# Patient Record
Sex: FEMALE | Race: WHITE | NOT HISPANIC OR LATINO | Employment: UNEMPLOYED | URBAN - METROPOLITAN AREA
[De-identification: names, ages, dates, MRNs, and addresses within clinical notes are randomized per-mention and may not be internally consistent; named-entity substitution may affect disease eponyms.]

---

## 2017-01-11 ENCOUNTER — TELEPHONE (OUTPATIENT)
Dept: OBSTETRICS AND GYNECOLOGY | Facility: CLINIC | Age: 27
End: 2017-01-11

## 2017-01-11 RX ORDER — LABETALOL 200 MG/1
200 TABLET, FILM COATED ORAL 2 TIMES DAILY
Qty: 60 TABLET | Refills: 1 | Status: SHIPPED | OUTPATIENT
Start: 2017-01-11 | End: 2017-01-13 | Stop reason: SDUPTHER

## 2017-01-11 RX ORDER — NIFEDIPINE 30 MG/1
30 TABLET, EXTENDED RELEASE ORAL DAILY
Qty: 30 TABLET | Refills: 1 | Status: SHIPPED | OUTPATIENT
Start: 2017-01-11 | End: 2017-01-13 | Stop reason: SDUPTHER

## 2017-01-11 NOTE — TELEPHONE ENCOUNTER
----- Message from Chioma Hughes sent at 1/11/2017 11:36 AM EST -----  Regarding: REFILL  Contact: [phone]  NEEDS A REFILL ON HER BLOOD PRESSURE MEDICINE. DR MACIAS. PHARMACY [facility] [address]. [phone]      Dr. Macias  pt  1/11/2017 @12:29pm [phone] patient requesting a refill on her blood pressure medicine: Procardia XL 30 mg and Labetalol 200mg. She has an appointment with Dr. Macias on 1/25/2017

## 2017-01-13 RX ORDER — NIFEDIPINE 30 MG/1
30 TABLET, EXTENDED RELEASE ORAL DAILY
Qty: 30 TABLET | Refills: 0 | Status: SHIPPED | OUTPATIENT
Start: 2017-01-13 | End: 2017-02-10 | Stop reason: SDUPTHER

## 2017-01-13 RX ORDER — LABETALOL 200 MG/1
200 TABLET, FILM COATED ORAL 2 TIMES DAILY
Qty: 60 TABLET | Refills: 0 | Status: SHIPPED | OUTPATIENT
Start: 2017-01-13 | End: 2018-02-02 | Stop reason: SDUPTHER

## 2017-01-16 RX ORDER — VITAMIN A ACETATE, .BETA.-CAROTENE, ASCORBIC ACID, CHOLECALCIFEROL, .ALPHA.-TOCOPHEROL ACETATE, DL-, THIAMINE MONONITRATE, RIBOFLAVIN, NIACINAMIDE, PYRIDOXINE HYDROCHLORIDE, FOLIC ACID, CYANOCOBALAMIN, CALCIUM CARBONATE, FERROUS FUMARATE, ZINC OXIDE, AND CUPRIC OXIDE 2000; 2000; 120; 400; 22; 1.84; 3; 20; 10; 1; 12; 200; 27; 25; 2 [IU]/1; [IU]/1; MG/1; [IU]/1; MG/1; MG/1; MG/1; MG/1; MG/1; MG/1; UG/1; MG/1; MG/1; MG/1; MG/1
TABLET ORAL
Qty: 30 TABLET | Refills: 0 | Status: SHIPPED | OUTPATIENT
Start: 2017-01-16 | End: 2019-03-28

## 2017-02-10 ENCOUNTER — TELEPHONE (OUTPATIENT)
Dept: OBSTETRICS AND GYNECOLOGY | Facility: CLINIC | Age: 27
End: 2017-02-10

## 2017-02-10 RX ORDER — NIFEDIPINE 30 MG/1
30 TABLET, EXTENDED RELEASE ORAL DAILY
Qty: 30 TABLET | Refills: 0 | Status: SHIPPED | OUTPATIENT
Start: 2017-02-10 | End: 2018-02-02 | Stop reason: SDUPTHER

## 2017-02-10 NOTE — TELEPHONE ENCOUNTER
----- Message from Polly De La Cruz sent at 2/10/2017 10:32 AM EST -----  Regarding: bp meds  Contact: [phone]  Dr Macias pt asking for refill on bp meds - Nifedipine 30   [facility] [address]        Pt is requesting a refill on her Nifedipine. She was given a mth refill last mth and no show her appt.

## 2018-02-02 ENCOUNTER — OFFICE VISIT (OUTPATIENT)
Dept: OBSTETRICS AND GYNECOLOGY | Facility: CLINIC | Age: 28
End: 2018-02-02

## 2018-02-02 VITALS
WEIGHT: 270 LBS | HEIGHT: 63 IN | SYSTOLIC BLOOD PRESSURE: 128 MMHG | DIASTOLIC BLOOD PRESSURE: 70 MMHG | BODY MASS INDEX: 47.84 KG/M2

## 2018-02-02 DIAGNOSIS — Z30.46 ENCOUNTER FOR SURVEILLANCE OF IMPLANTABLE SUBDERMAL CONTRACEPTIVE: Primary | ICD-10-CM

## 2018-02-02 PROCEDURE — 99213 OFFICE O/P EST LOW 20 MIN: CPT | Performed by: OBSTETRICS & GYNECOLOGY

## 2018-02-02 PROCEDURE — 11982 REMOVE DRUG IMPLANT DEVICE: CPT | Performed by: OBSTETRICS & GYNECOLOGY

## 2018-02-02 RX ORDER — LABETALOL 200 MG/1
200 TABLET, FILM COATED ORAL EVERY 12 HOURS SCHEDULED
Qty: 60 TABLET | Refills: 2 | Status: SHIPPED | OUTPATIENT
Start: 2018-02-02 | End: 2019-03-28

## 2018-02-02 RX ORDER — SODIUM CHLORIDE 0.9 % (FLUSH) 0.9 %
1-10 SYRINGE (ML) INJECTION AS NEEDED
Status: CANCELLED | OUTPATIENT
Start: 2018-02-02

## 2018-02-02 RX ORDER — NIFEDIPINE 30 MG/1
30 TABLET, EXTENDED RELEASE ORAL DAILY
Qty: 30 TABLET | Refills: 2 | Status: SHIPPED | OUTPATIENT
Start: 2018-02-02 | End: 2019-03-28 | Stop reason: SDUPTHER

## 2018-02-02 RX ORDER — ACETAMINOPHEN AND CODEINE PHOSPHATE 120; 12 MG/5ML; MG/5ML
1 SOLUTION ORAL DAILY
Qty: 28 TABLET | Refills: 3 | Status: SHIPPED | OUTPATIENT
Start: 2018-02-02 | End: 2019-02-02

## 2018-02-02 NOTE — PROGRESS NOTES
"Nexplanon Removal    Date of procedure:  2/2/2018    Risks and benefits discussed? yes  All questions answered? yes  Consents given by the patient  Written consent obtained? yes    Local anesthesia used:  2-3 ml 1% lidocaine near previous insertion site    Procedure documentation:    The upper left arm  was marked at the intended site of removal.  Betadine was used to cleanse the skin.  1% lidocaine was injected.  An incision was created near the distal tip of the implant.  The implant was removed intact without difficulty.  Steri-strips were placed across the site of insertion and a band-aid  was placed.    She tolerated the procedure well.  There were no complications.  EBL was minimal.    Post procedure instructions: Remove the band-aid in 24 hours and the steri-strips in 5 days. Keep the arm \"high and dry\" for 24-36 hours. Use ice or NSAID's as needed.    Follow up needed: PRN    This note was electronically signed.    Henri Macias M.D.  February 2, 2018        "

## 2018-02-02 NOTE — PROGRESS NOTES
"Subjective   Chief Complaint   Patient presents with   • Contraception     Wants to discuss tubal instead of nexplanon, wants her nexplanon removed     Kelin Jimenez is a 27 y.o. year old .  No LMP recorded. Patient has had an implant.  She presents to be seen because of She has had problems with weight gain on the Nexplanon.  Also some moodiness potentially.  She wants to get her tubes tied.  She's had 1 child in the past.  She is here with her son and father of baby who I believe has multiple other children with other partners.  Also here with her sister.  She was here tubes tied.  Mention that at 54 and [age] and her 70 pounds to be increased risk for laparoscopic surgery and that I would prefer to do an Essure procedure.  He is calm per spokesman due to developmental delay and limited intellectual capacity.  In any event he is agreeable with me as long as it is permanent surgical sterilization.  She understands that this.  Discussed that she'll need to sign a somewhat to wait 30 days given her insurance dictates that.    Past 6 month menstrual and contraceptive history:    No LMP recorded. Patient has had an implant.                              The following portions of the patient's history were reviewed and updated as appropriate:current medications and allergies no family history of any gynecologic cancers or breast cancer    Review of Systems regular bladder and bowels     Objective   /70  Ht 158.8 cm (62.5\")  Wt 122 kg (270 lb)  BMI 48.6 kg/m2    General:  well developed; well nourished  no acute distress  appears stated age  obese - Body mass index is 48.6 kg/(m^2).   Skin:  No suspicious lesions seen   Thyroid: not examined   Lungs:  breathing is unlabored   Heart:  Not performed.   Abdomen: soft, non-tender; no masses  no umbilical or inginual hernias are present  no hepato-splenomegaly   Pelvis: Not performed.     Lab Review   No data reviewed    Imaging   No data reviewed       Assessment "   1. Desires permanent surgical sterilization  2. Morbid obesity  3. Desires Nexplanon removal due to side effects which include weight gain.  Options discussed including birth control pills and also will stimulate weight King she reports she hasn't done that on pills in the past I discussed IUD if she is not 100% sure as opposed to the Essure which I would perform before a standard tubal ligation given her body mass index     Plan   1. She will sign paperwork for Essure sterilization and I will put in prep for surgery  2. I will refill her blood pressure medicines as she requests  3. Start Nor-QD birth control pills which should not affect her blood pressure adversely    Medications Rx this encounter:  New Medications Ordered This Visit   Medications   • NIFEdipine XL (NIFEDICAL XL) 30 MG 24 hr tablet     Sig: Take 1 tablet by mouth Daily.     Dispense:  30 tablet     Refill:  2   • labetalol (NORMODYNE) 200 MG tablet     Sig: Take 1 tablet by mouth Every 12 (Twelve) Hours.     Dispense:  60 tablet     Refill:  2   • norethindrone (MICRONOR) 0.35 MG tablet     Sig: Take 1 tablet by mouth Daily.     Dispense:  28 tablet     Refill:  3          This note was electronically signed.    Henri Macias MD  February 2, 2018

## 2018-02-27 ENCOUNTER — TELEPHONE (OUTPATIENT)
Dept: OBSTETRICS AND GYNECOLOGY | Facility: CLINIC | Age: 28
End: 2018-02-27

## 2018-02-27 NOTE — TELEPHONE ENCOUNTER
Dr Macias Pt is scheduled for an Essure procedure on 03/16/18 and she has questions regarding side effects.  She read on the internet about blood clots and other problems that can come from having this.  She states she did not speak with Dr. Macias regarding any of this before being scheduled.  She was informed that Dr. Macias and Estrella Payton were out this week so she may not get a call back until the early part of next week once they both return to the office.

## 2018-03-13 ENCOUNTER — TELEPHONE (OUTPATIENT)
Dept: OBSTETRICS AND GYNECOLOGY | Facility: CLINIC | Age: 28
End: 2018-03-13

## 2018-03-14 RX ORDER — PYRAZINAMIDE 500 MG/1
1 TABLET ORAL EVERY 4 HOURS PRN
Qty: 6 TABLET | Refills: 0 | Status: CANCELLED | OUTPATIENT
Start: 2018-03-14 | End: 2018-03-17

## 2018-03-14 RX ORDER — KETOROLAC TROMETHAMINE 10 MG/1
10 TABLET, FILM COATED ORAL EVERY 6 HOURS PRN
Qty: 6 TABLET | Refills: 0 | Status: SHIPPED | OUTPATIENT
Start: 2018-03-14 | End: 2019-03-28

## 2018-03-14 RX ORDER — PYRAZINAMIDE 500 MG/1
1 TABLET ORAL EVERY 4 HOURS PRN
Qty: 6 TABLET | Refills: 0 | Status: SHIPPED | OUTPATIENT
Start: 2018-03-14 | End: 2019-03-14

## 2018-03-14 RX ORDER — KETOROLAC TROMETHAMINE 10 MG/1
10 TABLET, FILM COATED ORAL EVERY 6 HOURS PRN
Qty: 6 TABLET | Refills: 0 | Status: SHIPPED | OUTPATIENT
Start: 2018-03-14 | End: 2018-03-17

## 2018-03-14 RX ORDER — ALPRAZOLAM 0.5 MG/1
0.5 TABLET ORAL 2 TIMES DAILY PRN
Qty: 2 TABLET | Refills: 0 | Status: SHIPPED | OUTPATIENT
Start: 2018-03-14 | End: 2019-03-28

## 2018-03-14 RX ORDER — ALPRAZOLAM 0.5 MG/1
0.5 TABLET ORAL 2 TIMES DAILY PRN
Qty: 1 TABLET | Refills: 0 | Status: CANCELLED | OUTPATIENT
Start: 2018-03-14 | End: 2018-03-15

## 2018-03-14 NOTE — TELEPHONE ENCOUNTER
In office procedure meds called to pharmacy. Can you sign these prescribtions, epic won't let me.

## 2018-03-15 ENCOUNTER — TELEPHONE (OUTPATIENT)
Dept: OBSTETRICS AND GYNECOLOGY | Facility: CLINIC | Age: 28
End: 2018-03-15

## 2018-03-23 ENCOUNTER — TELEPHONE (OUTPATIENT)
Dept: OBSTETRICS AND GYNECOLOGY | Facility: CLINIC | Age: 28
End: 2018-03-23

## 2018-03-26 ENCOUNTER — TELEPHONE (OUTPATIENT)
Dept: OBSTETRICS AND GYNECOLOGY | Facility: CLINIC | Age: 28
End: 2018-03-26

## 2018-03-26 NOTE — TELEPHONE ENCOUNTER
Explained to pt and her boyfriend that [employer] will not cover a hysterectomy for birth control.. Pt voices understanding and rescheduled the Essure for the 3rd time.   Garcia Westbrook informed of rhe date change.

## 2018-03-28 ENCOUNTER — TELEPHONE (OUTPATIENT)
Dept: OBSTETRICS AND GYNECOLOGY | Facility: CLINIC | Age: 28
End: 2018-03-28

## 2018-03-28 NOTE — TELEPHONE ENCOUNTER
Provider Name VERNELL  Reason for Call WANTS HYSTERECTOMY NOT ESSURE  Pharmacy Name    Call Back Number [phone]    I called the patient to inform that [facility] denied a [facility]rectomy for sterilization and she is insistant that she is high risk and if she gets pregnant again she could die on the table.   I need more documentation to support the need for a hysterectomy for [employer] to approve it.

## 2018-03-28 NOTE — TELEPHONE ENCOUNTER
Please remind her that if she cancels or misses this next appointment in April I would be hard pressed to reschedule her for surgery

## 2018-03-29 ENCOUNTER — TELEPHONE (OUTPATIENT)
Dept: OBSTETRICS AND GYNECOLOGY | Facility: CLINIC | Age: 28
End: 2018-03-29

## 2018-03-29 NOTE — TELEPHONE ENCOUNTER
Called patient, she doesn't want the ESSURE procedure, she wants a hysterectomy, she states that she called her insurance again yesterday and they told her it was covered.

## 2018-03-29 NOTE — TELEPHONE ENCOUNTER
Called pt to inform that [facility] is dening her hysterectomy for sterilization,  She refused to understand, cancelled Essure procedure and stated she will find a doctor that will do the surgery.  Copies of denials mailed to patient.

## 2018-03-30 ENCOUNTER — TELEPHONE (OUTPATIENT)
Dept: OBSTETRICS AND GYNECOLOGY | Facility: CLINIC | Age: 28
End: 2018-03-30

## 2018-03-30 NOTE — TELEPHONE ENCOUNTER
Kelin Jimenez called today to ask why her appt for hysterectomy was cancelled.    I told her that her insurance [employer] is not going to cover it for the diagnosis of sterilization.  She states that her medical records should state that she is unable to get pregnanct again due to preeclampsia and going into a coma, dying on table. I explained to her that this was explained to Dr. Macias and he also states if not covered by insurance for stereilizaitioin it cant be done.  I told her she is welcome to pay out of pocket  But until I get approval from her insurance I can not schedule and she hung up on me.

## 2018-03-30 NOTE — TELEPHONE ENCOUNTER
Unfortunately Kelin is very confused as far as preeclampsia, coma Fitz on dying on the table is an exaggeration.  I would not spend any time arguing with her as this has been explained in the past.

## 2018-05-30 ENCOUNTER — TELEPHONE (OUTPATIENT)
Dept: OBSTETRICS AND GYNECOLOGY | Facility: CLINIC | Age: 28
End: 2018-05-30

## 2018-05-30 RX ORDER — LABETALOL 200 MG/1
200 TABLET, FILM COATED ORAL EVERY 12 HOURS SCHEDULED
Qty: 60 TABLET | Refills: 0 | Status: SHIPPED | OUTPATIENT
Start: 2018-05-30 | End: 2018-08-28 | Stop reason: SDUPTHER

## 2018-05-30 RX ORDER — NIFEDIPINE 30 MG/1
30 TABLET, EXTENDED RELEASE ORAL DAILY
Qty: 30 TABLET | Refills: 0 | Status: SHIPPED | OUTPATIENT
Start: 2018-05-30 | End: 2018-09-06 | Stop reason: SDUPTHER

## 2018-05-30 RX ORDER — ACETAMINOPHEN AND CODEINE PHOSPHATE 120; 12 MG/5ML; MG/5ML
1 SOLUTION ORAL DAILY
Qty: 28 TABLET | Refills: 12 | Status: SHIPPED | OUTPATIENT
Start: 2018-05-30 | End: 2019-03-28 | Stop reason: SDUPTHER

## 2018-05-30 NOTE — TELEPHONE ENCOUNTER
VERNELL    PT NEEDING REFILLS ON BIRTH CONTROL, NIFEDIPINE AND BLOOD PRESSURE MEDS NOREPHINDRONE    PHARM [facility] [address]

## 2018-08-29 RX ORDER — LABETALOL 200 MG/1
TABLET, FILM COATED ORAL
Qty: 60 TABLET | Refills: 5 | Status: SHIPPED | OUTPATIENT
Start: 2018-08-29 | End: 2019-03-28

## 2018-09-06 RX ORDER — NIFEDIPINE 30 MG/1
TABLET, FILM COATED, EXTENDED RELEASE ORAL
Qty: 30 TABLET | Refills: 0 | Status: SHIPPED | OUTPATIENT
Start: 2018-09-06 | End: 2018-10-29 | Stop reason: SDUPTHER

## 2018-09-06 NOTE — TELEPHONE ENCOUNTER
Pt called inquiring about refills on blood pressure medicine. I informed her that were sent electronically yesterday and that she needs an appt.  She is scheduled for February 2019.  She indicates that she recently fell and broke her ankle which required pins and plates.

## 2018-09-06 NOTE — TELEPHONE ENCOUNTER
Gilberto      Pt awaiting a refill on blood pressure meds, nisedipine 30mg.     [facility] on [address], [address]

## 2018-10-29 RX ORDER — NIFEDIPINE 30 MG/1
TABLET, FILM COATED, EXTENDED RELEASE ORAL
Qty: 30 TABLET | Refills: 3 | Status: SHIPPED | OUTPATIENT
Start: 2018-10-29 | End: 2019-03-28

## 2019-03-28 ENCOUNTER — OFFICE VISIT (OUTPATIENT)
Dept: OBSTETRICS AND GYNECOLOGY | Facility: CLINIC | Age: 29
End: 2019-03-28

## 2019-03-28 VITALS
SYSTOLIC BLOOD PRESSURE: 118 MMHG | BODY MASS INDEX: 50.02 KG/M2 | HEIGHT: 64 IN | WEIGHT: 293 LBS | DIASTOLIC BLOOD PRESSURE: 80 MMHG

## 2019-03-28 DIAGNOSIS — Z01.419 ENCOUNTER FOR WELL WOMAN EXAM WITH ROUTINE GYNECOLOGICAL EXAM: Primary | ICD-10-CM

## 2019-03-28 DIAGNOSIS — Z30.2 ENCOUNTER FOR STERILIZATION: ICD-10-CM

## 2019-03-28 PROCEDURE — 99395 PREV VISIT EST AGE 18-39: CPT | Performed by: OBSTETRICS & GYNECOLOGY

## 2019-03-28 RX ORDER — ACETAMINOPHEN AND CODEINE PHOSPHATE 120; 12 MG/5ML; MG/5ML
1 SOLUTION ORAL DAILY
Qty: 28 TABLET | Refills: 12 | Status: SHIPPED | OUTPATIENT
Start: 2019-03-28 | End: 2019-06-12 | Stop reason: HOSPADM

## 2019-03-28 RX ORDER — NIFEDIPINE 30 MG/1
30 TABLET, EXTENDED RELEASE ORAL DAILY
Qty: 30 TABLET | Refills: 5 | Status: SHIPPED | OUTPATIENT
Start: 2019-03-28

## 2019-03-28 NOTE — PROGRESS NOTES
Subjective   Chief Complaint   Patient presents with   • Annual Exam     desires btl     Kelin Jimenez is a 28 y.o. year old  presenting to be seen for her annual exam.    Current birth control method: oral progesterone-only contraceptive.  She has hypertension well controlled on both labetalol and nifedipine.  I think I would like to see how she does on the nifedipine only.  She also wants her tubes tied since she is high risk pregnancy hypertension may be preeclampsia etc. she had discussed the Essure procedure last year but did not get this completed.  That is no longer an option.  Would been better given her body habitus.  Discussed the permanent nature of the procedure also discussed possibly doing a salpingectomy rather than just a Band-Aid or banding.    No LMP recorded. Patient is not currently having periods (Reason: Oral contraceptives).    She is sexually active.   Condoms are not typically used.    Plato is painful or she is having problems :no  She has concerns about domestic violence: no.    Cycle Frequency: regular, predictable and consistent every 28 - 32 days   Menstrual cycle character: flow is typically normal and flow is typically moderately heavy   Cycle Duration: 5 - 5   Number of heavy days of flows: 1   Intermenstrual bleeding present: no   Post-coital bleeding present: no     She exercises regularly: no.  Calcium intake: is not adequate.2  Caffeine intake: 4 + cups of coffee or equivalent   Self breast awareness:not asked.    Alcohol :does not drink  Social History    Tobacco Use      Smoking status: Never Smoker      Smokeless tobacco: Never Used      The following portions of the patient's history were reviewed and updated as is  appropriate:problem list, current medications, allergies, past family history, past medical history, past social history and past surgical history.    Current Outpatient Medications:   •  labetalol (NORMODYNE) 200 MG tablet, Take 1 tablet by mouth Every  "12 (Twelve) Hours., Disp: 60 tablet, Rfl: 2  •  NIFEdipine XL (NIFEDICAL XL) 30 MG 24 hr tablet, Take 1 tablet by mouth Daily., Disp: 30 tablet, Rfl: 2  •  norethindrone (MICRONOR) 0.35 MG tablet, Take 1 tablet by mouth Daily., Disp: 28 tablet, Rfl: 12  •  Prenatal Vit w/Us-Jvtytgppm-GQ (PNV PO), Take  by mouth., Disp: , Rfl:     Review of Systems    normal bladder and bowels  Objective   /80   Ht 161.3 cm (63.5\")   Wt 133 kg (294 lb)   Breastfeeding? No   BMI 51.26 kg/m²       General:  well developed; well nourished  no acute distress  appears stated age   Skin:  No suspicious lesions seen  acne   Thyroid:    Breasts:  Examined in supine position  Symmetric without masses or skin dimpling  Nipples normal without inversion, lesions or discharge  There are no palpable axillary nodes   Abdomen: soft, non-tender; no masses  no umbilical or inguinal hernias are present  no hepato-splenomegaly   Sl tender on the left   Pelvis: Clinical staff was present for exam  External genitalia:  normal appearance of the external genitalia including Bartholin's and [place]'s glands.  :  urethral meatus normal;  Vaginal:  normal pink mucosa without prolapse or lesions.  Cervix:  Pap obtained Difficult to visualize cervix due to body habitus  Uterus:  anteverted; Nontender once again difficult to palpate due to body habitus  Adnexa:  non palpable bilaterally.       Lab Review   No data reviewed    Imaging  No data reviewed       Assessment   1. Normal GYN examination as best I can tell.  2. Morbid obesity BMI 51.3  3. Desires sterilization.  We will also give information regarding IUD although this would be difficult to place in the office as I cannot really do an adequate pelvic examination due to body habitus.she declines a IUD                Plan   1. 1000 mg calcium in divided doses ideally in diet; regular exercise  2. Self breast awareness and mammogram protocols discussed.  3. Signed tubal consent form and given " information regarding above  4. Annual examination or sooner as needed; follow-up Pap testing        No orders of the defined types were placed in this encounter.    No orders of the defined types were placed in this encounter.           This note was electronically signed.    Henri Macias MD  3/28/2019

## 2019-04-01 ENCOUNTER — TELEPHONE (OUTPATIENT)
Dept: OBSTETRICS AND GYNECOLOGY | Facility: CLINIC | Age: 29
End: 2019-04-01

## 2019-04-01 ENCOUNTER — PREP FOR SURGERY (OUTPATIENT)
Dept: OTHER | Facility: HOSPITAL | Age: 29
End: 2019-04-01

## 2019-04-01 DIAGNOSIS — Z30.2 REQUEST FOR STERILIZATION: Primary | ICD-10-CM

## 2019-04-01 RX ORDER — CEFAZOLIN SODIUM 2 G/100ML
2 INJECTION, SOLUTION INTRAVENOUS
Status: CANCELLED | OUTPATIENT
Start: 2019-04-01

## 2019-04-01 RX ORDER — SODIUM CHLORIDE 0.9 % (FLUSH) 0.9 %
1-10 SYRINGE (ML) INJECTION AS NEEDED
Status: CANCELLED | OUTPATIENT
Start: 2019-04-01

## 2019-04-01 RX ORDER — SODIUM CHLORIDE 0.9 % (FLUSH) 0.9 %
3 SYRINGE (ML) INJECTION EVERY 12 HOURS SCHEDULED
Status: CANCELLED | OUTPATIENT
Start: 2019-04-01

## 2019-04-01 NOTE — TELEPHONE ENCOUNTER
Called Kelin to explain that Laparoscopic Tubal Banding will need to be done in Main OR. Date available is 5/16, she and family are agreeable to this date. Explained that she will need to come for a pre op appt and got to Pre Admission Testing prior so she will come for Pre op on 5/14 at 11:10 AM. Will also mail her a letter.with above information.

## 2019-04-02 PROBLEM — Z30.2 REQUEST FOR STERILIZATION: Status: ACTIVE | Noted: 2019-04-02

## 2019-05-13 ENCOUNTER — TELEPHONE (OUTPATIENT)
Dept: OBSTETRICS AND GYNECOLOGY | Facility: CLINIC | Age: 29
End: 2019-05-13

## 2019-05-13 NOTE — TELEPHONE ENCOUNTER
"Kelin calling to say she cannot come for her tubal this week. Says \"her old man has to be in court and may be in senior living.\" She has no other way to get here. I called OR scheduling, spoke w/ Fabby, then spoke w/ Pre op nurses to see if we could schedule surgery and PAT the same day. I was told this is OK, she can do PAT the morning of surgery since the required 30-day prior [place] Sterilization permit is signed. She can sign the other Op permit the morning of surgery.  Called pt back to explain this to her and we have moved her surgery to June 12th, 2019. She asked me to explain this to her sister, Valeri Mary who was there with her, which I did. I will also mail Kelin a letter with these new instructions and dates. Surgery re-scheduled to 6/12/19.  "

## 2019-05-14 ENCOUNTER — APPOINTMENT (OUTPATIENT)
Dept: PREADMISSION TESTING | Facility: HOSPITAL | Age: 29
End: 2019-05-14

## 2019-06-11 ENCOUNTER — TELEPHONE (OUTPATIENT)
Dept: OBSTETRICS AND GYNECOLOGY | Facility: CLINIC | Age: 29
End: 2019-06-11

## 2019-06-11 NOTE — TELEPHONE ENCOUNTER
Called Kelin to discuss plan for surgery scheduled tomorrow with Dr Macias. Confirmed that pt understands she needs to arrive at Surgery Registration at 7:00 AM since she will have PAT at that time. She also verbalizes understanding that she must have a responsible adult with her.and that she should not eat or drink anything after midnight tonight.

## 2019-06-12 ENCOUNTER — ANESTHESIA EVENT (OUTPATIENT)
Dept: PERIOP | Facility: HOSPITAL | Age: 29
End: 2019-06-12

## 2019-06-12 ENCOUNTER — ANESTHESIA (OUTPATIENT)
Dept: PERIOP | Facility: HOSPITAL | Age: 29
End: 2019-06-12

## 2019-06-12 ENCOUNTER — HOSPITAL ENCOUNTER (OUTPATIENT)
Facility: HOSPITAL | Age: 29
Discharge: HOME | End: 2019-06-12
Attending: OBSTETRICS & GYNECOLOGY | Admitting: OBSTETRICS & GYNECOLOGY

## 2019-06-12 VITALS
OXYGEN SATURATION: 95 % | DIASTOLIC BLOOD PRESSURE: 82 MMHG | HEART RATE: 98 BPM | SYSTOLIC BLOOD PRESSURE: 146 MMHG | BODY MASS INDEX: 51.91 KG/M2 | HEIGHT: 63 IN | WEIGHT: 293 LBS | TEMPERATURE: 97.5 F | RESPIRATION RATE: 18 BRPM

## 2019-06-12 DIAGNOSIS — Z30.2 REQUEST FOR STERILIZATION: ICD-10-CM

## 2019-06-12 LAB
B-HCG UR QL: NEGATIVE
INTERNAL NEGATIVE CONTROL: NEGATIVE
INTERNAL POSITIVE CONTROL: POSITIVE
Lab: NORMAL
POTASSIUM BLD-SCNC: 4 MMOL/L (ref 3.5–5.2)

## 2019-06-12 PROCEDURE — 25010000002 PROPOFOL 10 MG/ML EMULSION: Performed by: NURSE ANESTHETIST, CERTIFIED REGISTERED

## 2019-06-12 PROCEDURE — 81025 URINE PREGNANCY TEST: CPT | Performed by: ANESTHESIOLOGY

## 2019-06-12 PROCEDURE — 84132 ASSAY OF SERUM POTASSIUM: CPT | Performed by: ANESTHESIOLOGY

## 2019-06-12 PROCEDURE — 25010000002 KETOROLAC TROMETHAMINE PER 15 MG: Performed by: NURSE ANESTHETIST, CERTIFIED REGISTERED

## 2019-06-12 PROCEDURE — 58661 LAPAROSCOPY REMOVE ADNEXA: CPT | Performed by: OBSTETRICS & GYNECOLOGY

## 2019-06-12 PROCEDURE — 25010000002 FENTANYL CITRATE (PF) 100 MCG/2ML SOLUTION: Performed by: NURSE ANESTHETIST, CERTIFIED REGISTERED

## 2019-06-12 PROCEDURE — 25010000002 ONDANSETRON PER 1 MG: Performed by: NURSE ANESTHETIST, CERTIFIED REGISTERED

## 2019-06-12 PROCEDURE — 25010000003 CEFAZOLIN IN DEXTROSE 2-4 GM/100ML-% SOLUTION: Performed by: OBSTETRICS & GYNECOLOGY

## 2019-06-12 PROCEDURE — 25010000002 DEXAMETHASONE PER 1 MG: Performed by: NURSE ANESTHETIST, CERTIFIED REGISTERED

## 2019-06-12 PROCEDURE — 93010 ELECTROCARDIOGRAM REPORT: CPT | Performed by: INTERNAL MEDICINE

## 2019-06-12 PROCEDURE — 93005 ELECTROCARDIOGRAM TRACING: CPT | Performed by: ANESTHESIOLOGY

## 2019-06-12 PROCEDURE — 25010000002 HYDROMORPHONE PER 4 MG: Performed by: NURSE ANESTHETIST, CERTIFIED REGISTERED

## 2019-06-12 PROCEDURE — 25010000002 NEOSTIGMINE 10 MG/10ML SOLUTION: Performed by: NURSE ANESTHETIST, CERTIFIED REGISTERED

## 2019-06-12 RX ORDER — DEXAMETHASONE SODIUM PHOSPHATE 4 MG/ML
INJECTION, SOLUTION INTRA-ARTICULAR; INTRALESIONAL; INTRAMUSCULAR; INTRAVENOUS; SOFT TISSUE AS NEEDED
Status: DISCONTINUED | OUTPATIENT
Start: 2019-06-12 | End: 2019-06-12 | Stop reason: SURG

## 2019-06-12 RX ORDER — IBUPROFEN 600 MG/1
600 TABLET ORAL EVERY 4 HOURS PRN
Status: DISCONTINUED | OUTPATIENT
Start: 2019-06-12 | End: 2019-06-12 | Stop reason: HOSPADM

## 2019-06-12 RX ORDER — ONDANSETRON 2 MG/ML
INJECTION INTRAMUSCULAR; INTRAVENOUS AS NEEDED
Status: DISCONTINUED | OUTPATIENT
Start: 2019-06-12 | End: 2019-06-12 | Stop reason: SURG

## 2019-06-12 RX ORDER — FAMOTIDINE 10 MG/ML
20 INJECTION, SOLUTION INTRAVENOUS ONCE
Status: CANCELLED | OUTPATIENT
Start: 2019-06-12 | End: 2019-06-12

## 2019-06-12 RX ORDER — BUPIVACAINE HYDROCHLORIDE AND EPINEPHRINE 5; 5 MG/ML; UG/ML
INJECTION, SOLUTION PERINEURAL AS NEEDED
Status: DISCONTINUED | OUTPATIENT
Start: 2019-06-12 | End: 2019-06-12 | Stop reason: HOSPADM

## 2019-06-12 RX ORDER — LIDOCAINE HYDROCHLORIDE 10 MG/ML
0.5 INJECTION, SOLUTION EPIDURAL; INFILTRATION; INTRACAUDAL; PERINEURAL ONCE AS NEEDED
Status: COMPLETED | OUTPATIENT
Start: 2019-06-12 | End: 2019-06-12

## 2019-06-12 RX ORDER — CEFAZOLIN SODIUM 2 G/100ML
2 INJECTION, SOLUTION INTRAVENOUS
Status: DISCONTINUED | OUTPATIENT
Start: 2019-06-12 | End: 2019-06-12 | Stop reason: HOSPADM

## 2019-06-12 RX ORDER — HYDROMORPHONE HYDROCHLORIDE 1 MG/ML
0.5 INJECTION, SOLUTION INTRAMUSCULAR; INTRAVENOUS; SUBCUTANEOUS
Status: DISCONTINUED | OUTPATIENT
Start: 2019-06-12 | End: 2019-06-12 | Stop reason: HOSPADM

## 2019-06-12 RX ORDER — SODIUM CHLORIDE 0.9 % (FLUSH) 0.9 %
3 SYRINGE (ML) INJECTION EVERY 12 HOURS SCHEDULED
Status: CANCELLED | OUTPATIENT
Start: 2019-06-12

## 2019-06-12 RX ORDER — PROMETHAZINE HYDROCHLORIDE 25 MG/ML
12.5 INJECTION, SOLUTION INTRAMUSCULAR; INTRAVENOUS ONCE AS NEEDED
Status: DISCONTINUED | OUTPATIENT
Start: 2019-06-12 | End: 2019-06-12 | Stop reason: HOSPADM

## 2019-06-12 RX ORDER — LIDOCAINE HYDROCHLORIDE 10 MG/ML
INJECTION, SOLUTION EPIDURAL; INFILTRATION; INTRACAUDAL; PERINEURAL AS NEEDED
Status: DISCONTINUED | OUTPATIENT
Start: 2019-06-12 | End: 2019-06-12 | Stop reason: SURG

## 2019-06-12 RX ORDER — IBUPROFEN 600 MG/1
600 TABLET ORAL EVERY 6 HOURS PRN
Qty: 20 TABLET | Refills: 0 | Status: SHIPPED | OUTPATIENT
Start: 2019-06-12

## 2019-06-12 RX ORDER — SODIUM CHLORIDE 0.9 % (FLUSH) 0.9 %
3-10 SYRINGE (ML) INJECTION AS NEEDED
Status: CANCELLED | OUTPATIENT
Start: 2019-06-12

## 2019-06-12 RX ORDER — PROMETHAZINE HYDROCHLORIDE 25 MG/1
25 SUPPOSITORY RECTAL ONCE AS NEEDED
Status: DISCONTINUED | OUTPATIENT
Start: 2019-06-12 | End: 2019-06-12 | Stop reason: HOSPADM

## 2019-06-12 RX ORDER — NEOSTIGMINE METHYLSULFATE 1 MG/ML
INJECTION, SOLUTION INTRAVENOUS AS NEEDED
Status: DISCONTINUED | OUTPATIENT
Start: 2019-06-12 | End: 2019-06-12 | Stop reason: SURG

## 2019-06-12 RX ORDER — HYDROCODONE BITARTRATE AND ACETAMINOPHEN 5; 325 MG/1; MG/1
1-2 TABLET ORAL EVERY 6 HOURS PRN
Qty: 8 TABLET | Refills: 0 | Status: SHIPPED | OUTPATIENT
Start: 2019-06-12

## 2019-06-12 RX ORDER — PROPOFOL 10 MG/ML
VIAL (ML) INTRAVENOUS AS NEEDED
Status: DISCONTINUED | OUTPATIENT
Start: 2019-06-12 | End: 2019-06-12 | Stop reason: SURG

## 2019-06-12 RX ORDER — GLYCOPYRROLATE 0.2 MG/ML
INJECTION INTRAMUSCULAR; INTRAVENOUS AS NEEDED
Status: DISCONTINUED | OUTPATIENT
Start: 2019-06-12 | End: 2019-06-12 | Stop reason: SURG

## 2019-06-12 RX ORDER — KETOROLAC TROMETHAMINE 30 MG/ML
INJECTION, SOLUTION INTRAMUSCULAR; INTRAVENOUS AS NEEDED
Status: DISCONTINUED | OUTPATIENT
Start: 2019-06-12 | End: 2019-06-12 | Stop reason: SURG

## 2019-06-12 RX ORDER — ONDANSETRON 2 MG/ML
4 INJECTION INTRAMUSCULAR; INTRAVENOUS ONCE AS NEEDED
Status: DISCONTINUED | OUTPATIENT
Start: 2019-06-12 | End: 2019-06-12 | Stop reason: HOSPADM

## 2019-06-12 RX ORDER — MAGNESIUM HYDROXIDE 1200 MG/15ML
LIQUID ORAL AS NEEDED
Status: DISCONTINUED | OUTPATIENT
Start: 2019-06-12 | End: 2019-06-12 | Stop reason: HOSPADM

## 2019-06-12 RX ORDER — SODIUM CHLORIDE 9 MG/ML
INJECTION, SOLUTION INTRAVENOUS AS NEEDED
Status: DISCONTINUED | OUTPATIENT
Start: 2019-06-12 | End: 2019-06-12 | Stop reason: HOSPADM

## 2019-06-12 RX ORDER — ATRACURIUM BESYLATE 10 MG/ML
INJECTION, SOLUTION INTRAVENOUS AS NEEDED
Status: DISCONTINUED | OUTPATIENT
Start: 2019-06-12 | End: 2019-06-12 | Stop reason: SURG

## 2019-06-12 RX ORDER — FAMOTIDINE 20 MG/1
20 TABLET, FILM COATED ORAL ONCE
Status: COMPLETED | OUTPATIENT
Start: 2019-06-12 | End: 2019-06-12

## 2019-06-12 RX ORDER — FENTANYL CITRATE 50 UG/ML
INJECTION, SOLUTION INTRAMUSCULAR; INTRAVENOUS AS NEEDED
Status: DISCONTINUED | OUTPATIENT
Start: 2019-06-12 | End: 2019-06-12 | Stop reason: SURG

## 2019-06-12 RX ORDER — PROMETHAZINE HYDROCHLORIDE 25 MG/1
25 TABLET ORAL ONCE AS NEEDED
Status: DISCONTINUED | OUTPATIENT
Start: 2019-06-12 | End: 2019-06-12 | Stop reason: HOSPADM

## 2019-06-12 RX ORDER — FENTANYL CITRATE 50 UG/ML
50 INJECTION, SOLUTION INTRAMUSCULAR; INTRAVENOUS
Status: DISCONTINUED | OUTPATIENT
Start: 2019-06-12 | End: 2019-06-12 | Stop reason: HOSPADM

## 2019-06-12 RX ORDER — HYDROCODONE BITARTRATE AND ACETAMINOPHEN 5; 325 MG/1; MG/1
1 TABLET ORAL EVERY 6 HOURS PRN
Status: DISCONTINUED | OUTPATIENT
Start: 2019-06-12 | End: 2019-06-12 | Stop reason: HOSPADM

## 2019-06-12 RX ORDER — SODIUM CHLORIDE, SODIUM LACTATE, POTASSIUM CHLORIDE, CALCIUM CHLORIDE 600; 310; 30; 20 MG/100ML; MG/100ML; MG/100ML; MG/100ML
9 INJECTION, SOLUTION INTRAVENOUS CONTINUOUS
Status: DISCONTINUED | OUTPATIENT
Start: 2019-06-12 | End: 2019-06-12 | Stop reason: HOSPADM

## 2019-06-12 RX ADMIN — HYDROMORPHONE HYDROCHLORIDE 0.5 MG: 1 INJECTION, SOLUTION INTRAMUSCULAR; INTRAVENOUS; SUBCUTANEOUS at 12:27

## 2019-06-12 RX ADMIN — DEXAMETHASONE SODIUM PHOSPHATE 8 MG: 4 INJECTION, SOLUTION INTRAMUSCULAR; INTRAVENOUS at 11:00

## 2019-06-12 RX ADMIN — IBUPROFEN 600 MG: 600 TABLET ORAL at 14:36

## 2019-06-12 RX ADMIN — SODIUM CHLORIDE, POTASSIUM CHLORIDE, SODIUM LACTATE AND CALCIUM CHLORIDE 9 ML/HR: 600; 310; 30; 20 INJECTION, SOLUTION INTRAVENOUS at 09:15

## 2019-06-12 RX ADMIN — PROPOFOL 200 MG: 10 INJECTION, EMULSION INTRAVENOUS at 10:52

## 2019-06-12 RX ADMIN — FAMOTIDINE 20 MG: 20 TABLET ORAL at 09:25

## 2019-06-12 RX ADMIN — CEFAZOLIN SODIUM 2 G: 2 INJECTION, SOLUTION INTRAVENOUS at 10:45

## 2019-06-12 RX ADMIN — LIDOCAINE HYDROCHLORIDE 50 MG: 10 INJECTION, SOLUTION EPIDURAL; INFILTRATION; INTRACAUDAL; PERINEURAL at 10:52

## 2019-06-12 RX ADMIN — KETOROLAC TROMETHAMINE 30 MG: 30 INJECTION, SOLUTION INTRAMUSCULAR at 11:48

## 2019-06-12 RX ADMIN — PROPOFOL 50 MG: 10 INJECTION, EMULSION INTRAVENOUS at 11:25

## 2019-06-12 RX ADMIN — LIDOCAINE HYDROCHLORIDE 0.2 ML: 10 INJECTION, SOLUTION EPIDURAL; INFILTRATION; INTRACAUDAL; PERINEURAL at 09:15

## 2019-06-12 RX ADMIN — FENTANYL CITRATE 50 MCG: 50 INJECTION, SOLUTION INTRAMUSCULAR; INTRAVENOUS at 12:15

## 2019-06-12 RX ADMIN — ONDANSETRON 4 MG: 2 INJECTION INTRAMUSCULAR; INTRAVENOUS at 11:42

## 2019-06-12 RX ADMIN — NEOSTIGMINE METHYLSULFATE 2.5 MG: 1 INJECTION, SOLUTION INTRAVENOUS at 11:52

## 2019-06-12 RX ADMIN — FENTANYL CITRATE 50 MCG: 50 INJECTION, SOLUTION INTRAMUSCULAR; INTRAVENOUS at 12:45

## 2019-06-12 RX ADMIN — FENTANYL CITRATE 100 MCG: 50 INJECTION, SOLUTION INTRAMUSCULAR; INTRAVENOUS at 10:52

## 2019-06-12 RX ADMIN — ATRACURIUM BESYLATE 40 MG: 10 INJECTION, SOLUTION INTRAVENOUS at 10:52

## 2019-06-12 RX ADMIN — GLYCOPYRROLATE 0.4 MG: 0.2 INJECTION, SOLUTION INTRAMUSCULAR; INTRAVENOUS at 11:52

## 2019-06-12 RX ADMIN — HYDROCODONE BITARTRATE AND ACETAMINOPHEN 1 TABLET: 5; 325 TABLET ORAL at 14:36

## 2019-06-12 NOTE — OP NOTE
Procedure date:2019    Preoperative diagnosis: Desires permanent surgical sterilization    Postoperative diagnosis: Same plus omental adhesions    Procedure:  Bilateral salpingectomy and lysis of adhesions    Anesthesia: General    Estimated blood loss: 5- 10 mL    Drains: None    Prophylactic antibiotics: None    Brief history and indications for procedure: Kelin Jimenez is a 28 y.o. [id number] presents for bilateral salpingectomy for sterilization.  She is signed appropriate permits in the office.  She understands the risks and complications plan benefits of the above procedure.  We also discussed various options and birth control their risk and complications and benefits.  She elected to proceed with the salpingectomy for sterilization..          Procedure: The patient was taken to the operating room and placed in the dorsal lithotomy position.  She was placed under general anesthesia without complication.  Prepped and draped standard fashion.  Exam under anesthesia was difficult due to body habitus.  The uterus felt anterior.  The cervix was difficult to visualize with the shorter weighted speculum.  Anterior lip was grasped single-tooth tenaculum Rodrigues cannula placed in the endocervical canal.  A Lynch was used to drain the bladder.    After regloving local was placed intraumbilically incision made trocar placed in entering the abdominal pelvic cavity confirmed via the video laparoscope.  This revealed that I was in the midst of some omental adhesions.  No significant bleeding was noted.  I was able to gently pass the laparoscope through the adhesions and visualize this uterus and right adnexa.  On the right side local was placed in a 5 mm trocar placed under direct visualization.  Through this I was able to look to the left side and placed local in the trocar under direct visualization on the left side.  With these 2 incisions in place I was able to take down the omental adhesions so that I could  proceed surgically using the infraumbilical trocar.  The adhesions extended to the left side above the and adhesion of the uterus to the anterior left ear to knee and.  The right fallopian tube was caught to rise proximally to distally grasped with pickups and undermined and excised with scissors.  Procedure was completed in similar fashion on the left side.  Touch cautery was used for hemostasis.  The area was her ear irrigated and found to be hemostatic.  The abdomen was deflated for 1 minute and then reinflated hemostasis remained adequate also remained adequate where the adhesions were taken down.  Upper abdomen revealed smooth liver.  No other abnormalities were noted.  The left and midline trochars removed under direct visualization.  CO2 gas was allowed to escape and it left-sided trocar was removed.  The umbilical incision was closed deeply with a Vicryl stitch and all were closed subcuticularly with 3 0 plain suture.      The Lynch catheter was pulled .  Sponge and needle counts were correct ×2.  The patient was taken to the postop recovery area in stable condition.      Henri Macias M.D.

## 2019-06-12 NOTE — BRIEF OP NOTE
SALPINGECTOMY LAPAROSCOPIC  Progress Note    Kelin Jimenez  6/12/2019    Pre-op Diagnosis:   Request for sterilization [Z30.2]       Post-Op Diagnosis Codes:     * Request for sterilization [Z30.2]   omental adhesions   Procedure/CPT® Codes:      Procedure(s):  SALPINGECTOMY LAPAROSCOPIC, LYSIS OF ADHESIONS    Surgeon(s):  Henri Macias MD    Anesthesia: General    Staff:   Circulator: Thea Mosquera RN; Karuna Gonzalez RN  Scrub Person: Kimberly Us; Roxanna Wayne  Assistant: Karuna Braxton PA    Estimated Blood Loss: 5 mL    Urine Voided: 110 mL    Specimens:                None; photographic documentation          Drains:   [REMOVED] Urethral Catheter Silicone 16 Fr. (Removed)       Findings: omental adhesions and uterine to anterior peritoneal      Complications: none      Henri Macias MD     Date: 6/12/2019  Time: 12:00 PM

## 2019-06-12 NOTE — H&P
"Subjective   Kelin Jimenez is a 28 y.o. year old  who is scheduled  for surgery due to desire for permanent surgical sterilization. Risks and complications discussed along with options and the pros and cons of those options.    Past Medical History:   Diagnosis Date   • Asthma    • Frequent headaches    • Hypertension    • PERLA (obstructive sleep apnea)     undiagnosed     Past Surgical History:   Procedure Laterality Date   • ANKLE SURGERY Right 2018   •  SECTION  2015    Preeclampsia     Social History     Socioeconomic History   • Marital status: Single     Spouse name: Not on file   • Number of children: Not on file   • Years of education: Not on file   • Highest education level: Not on file   Tobacco Use   • Smoking status: Never Smoker   • Smokeless tobacco: Never Used   Substance and Sexual Activity   • Alcohol use: No   • Drug use: No   • Sexual activity: Yes       Current Facility-Administered Medications:   •  ceFAZolin in dextrose (ANCEF) IVPB solution 2 g, 2 g, Intravenous, 60 Min Pre-Op, Henri Macias MD  •  lactated ringers infusion, 9 mL/hr, Intravenous, Continuous, Ion Brar MD, Last Rate: 9 mL/hr at 19 0915, 9 mL/hr at 19 0915  •  sterile water irrigation solution, , , PRN, Henri Macias MD, 1,000 mL at 19 1030      No Known Allergies  Social History    Tobacco Use      Smoking status: Never Smoker      Smokeless tobacco: Never Used    Review of Systems normal bladder and bowels. No cough cold          Objective   /88 (BP Location: Right arm, Patient Position: Sitting)   Pulse 96   Temp 98.4 °F (36.9 °C) (Temporal)   Resp 18   Ht 160 cm (63\")   Wt 133 kg (293 lb)   SpO2 97%   Breastfeeding? No   BMI 51.90 kg/m²   General: well developed; well nourished  no acute distress  appears stated age   Heart: regular rate and rhythm   Lungs: breathing is unlabored  clear to auscultation bilaterally   Abdomen: soft, non-tender; no masses  no umbilical or " inguinal hernias are present  no hepato-splenomegaly   Pelvis:: Not performed.  difficult in the office due to body habitus but anteverted and nontender at appt in March          Assessment   1. Desires permanent surgical sterilization - permits signed  2. Standard risks of anesthesia, bleeding, infection and damage to surrounding structures discussed.  Additional risk for specific procedure discussed as well.  Patient had opportunity to ask and have her questions answered regarding the planned surgery, risks, options, postoperative recovery and expected outcome.       Plan   1. Laparoscopic bilateral salpingectomy       Henri Macias M.D.  6/12/2019

## 2019-06-12 NOTE — ANESTHESIA POSTPROCEDURE EVALUATION
Patient: Kelin Jimenez    Procedure Summary     Date:  06/12/19 Room / Location:   [facility]    Anesthesia Start:  1045 Anesthesia Stop:      Procedure:  SALPINGECTOMY LAPAROSCOPIC, LYSIS OF ADHESIONS (Bilateral Abdomen) Diagnosis:       Request for sterilization      (Request for sterilization [Z30.2])    Surgeon:  Henri Macias MD Provider:  Ion Brar MD    Anesthesia Type:  general ASA Status:  3          Anesthesia Type: general  Last vitals  /102  141/88 (06/12/19 0916)   Temp 97.2  98.4 °F (36.9 °C) (06/12/19 0916)   Pulse 97  96 (06/12/19 0916)   Resp 14  18 (06/12/19 0916)     SpO2 98  97 % (06/12/19 0916)     Post Anesthesia Care and Evaluation    Patient location during evaluation: PACU  Patient participation: complete - patient participated  Level of consciousness: awake and alert  Pain score: 0  Pain management: adequate  Airway patency: patent  Anesthetic complications: No anesthetic complications  PONV Status: none  Cardiovascular status: hemodynamically stable and acceptable  Respiratory status: nonlabored ventilation, acceptable and nasal cannula  Hydration status: acceptable

## 2019-06-12 NOTE — ANESTHESIA PREPROCEDURE EVALUATION
Anesthesia Evaluation     NPO Solid Status: > 8 hours  NPO Liquid Status: > 8 hours           Airway   Mallampati: III  TM distance: >3 FB  Possible difficult intubation  Dental      Pulmonary    (+) asthma (seasonal asthma, lungs good today), decreased breath sounds,   (-) not a smoker  Cardiovascular     Rate: normal    (+) hypertension,   (-) pacemaker, angina, murmur      Neuro/Psych  (-) seizures  GI/Hepatic/Renal/Endo    (-) liver disease, no renal disease, diabetes    Musculoskeletal     Abdominal    Substance History      OB/GYN          Other                        Anesthesia Plan    ASA 3     general   (GA  Glidescope ready in OR)  intravenous induction     Plan discussed with CRNA.

## 2019-06-12 NOTE — NURSING NOTE
Pt discharged from IR dept s/p bilateral salpingectomy.  Pt tolerating po pain medications without nausea, has eaten solid food, tolerating po fluids, has ambulated and has voided without difficulties. Discharge instructions reviewed with patient who verbalized understanding.  Written instructions given to patient.  Family reports that they picked up her prescriptions at the pharmacy.  Pt transported to exit via wheelchair per CNA.

## 2019-06-12 NOTE — ANESTHESIA PROCEDURE NOTES
Airway  Urgency: elective    Airway not difficult    General Information and Staff    Patient location during procedure: OR    Indications and Patient Condition  Indications for airway management: airway protection    Preoxygenated: yes  MILS not maintained throughout  Mask difficulty assessment: 1 - vent by mask    Final Airway Details  Final airway type: endotracheal airway      Successful airway: ETT  Cuffed: yes   Successful intubation technique: video laryngoscopy  Endotracheal tube insertion site: oral  Blade: Christopher  Blade size: 3  ETT size (mm): 7.0  Cormack-Lehane Classification: grade I - full view of glottis  Placement verified by: chest auscultation and capnometry   Measured from: lips  ETT to lips (cm): 20  Number of attempts at approach: 1    Additional Comments  Elective, pt ramped up with blankets in wedgeNegative epigastric sounds, Breath sound equal bilaterally with symmetric chest rise and fall

## 2024-12-31 NOTE — ADDENDUM NOTE
Addended by: SHERINE VOGT on: 5/30/2018 02:29 PM     Modules accepted: Orders    
RN attempted to call patient x2 and received busy signal each time. Will attempt to call patient at a later time.  No active patient portal.   
Rommel, Ph.D.   Licensed Clinical Psychologist          Time Documentation:     96136 x 1  96136 x1 (first 30 minutes)  96138 x 1  96139 x 6 Test Administration/Data Gathering By Technician: (3.5 hours). 96138 x 1 (first 30 minutes), 96139 x 6 (each additional 30 minutes)     96132 x 1  96133 x 1 Testing Evaluation Services by Neuropsychologist (2 hours, 50 minutes), 96132 x1 (first hour), 96133 x1 (additional 1 hour, 50 minutes)     The above includes: Record review.  Review of history provided by patient.  Review of collaborative information.  Testing by Clinician.  Review of raw data. Scoring. Report writing of individual tests administered by Clinician.  Integration of individual tests administered by psychometrist with NSE/testing by clinician, review of records/history/collaborative information, case Conceptualization, treatment planning, clinical decision making, report writing, coordination Of Care. Psychometry test codes as time spent by psychometrist administering and scoring neurocognitive/psychological tests under supervision of neuropsychologist.  Integral services including scoring of raw data, data interpretation, case conceptualization, report writing etcetera were initiated after the patient finished testing/raw data collected and was completed on the date the report was signed.      This note will not be viewable in [employer].  This note was created using voice recognition software. Despite editing, there may be syntax errors.

## (undated) DEVICE — SUT VIC 2/0 CT2 27IN J269H

## (undated) DEVICE — ENDOPATH XCEL UNIVERSAL TROCAR STABLILITY SLEEVES: Brand: ENDOPATH XCEL

## (undated) DEVICE — [HIGH FLOW INSUFFLATOR,  DO NOT USE IF PACKAGE IS DAMAGED,  KEEP DRY,  KEEP AWAY FROM SUNLIGHT,  PROTECT FROM HEAT AND RADIOACTIVE SOURCES.]: Brand: PNEUMOSURE

## (undated) DEVICE — ENDOPATH XCEL BLADELESS TROCARS WITH STABILITY SLEEVES: Brand: ENDOPATH XCEL

## (undated) DEVICE — GLV SURG TRIUMPH CLASSIC PF LTX 8 STRL

## (undated) DEVICE — GLV SURG SIGNATURE TOUCH PF LTX 7.5 STRL BX/50

## (undated) DEVICE — APPL CHLORAPREP W/ALC 26ML CLR

## (undated) DEVICE — GLV SURG SENSICARE MICRO PF LF 7 STRL

## (undated) DEVICE — ENDOCUT SCISSOR TIP, DISPOSABLE: Brand: RENEW

## (undated) DEVICE — SKIN AFFIX SURG ADHESIVE 72/CS 0.55ML: Brand: MEDLINE

## (undated) DEVICE — SUT GUT PLN 3/0 FS2 27IN 1630H

## (undated) DEVICE — LEX GYN MINOR LAPAROSCOPY: Brand: MEDLINE INDUSTRIES, INC.

## (undated) DEVICE — KT POSTN TRENDELENBURG DLX WING PAD TABL STRAP HDRST XL 1P/U

## (undated) DEVICE — COVER,LIGHT HANDLE,FLX,1/PK: Brand: MEDLINE INDUSTRIES, INC.